# Patient Record
(demographics unavailable — no encounter records)

---

## 2024-10-25 NOTE — PHYSICAL EXAM
[3rd] : 3rd [PIP Joint] : PIP joint [5___] : volarflexion 5[unfilled]/5 [] : good capillary refill in all fingers [Left] : left fingers [There are no fractures, subluxations or dislocations. No significant abnormalities are seen] : There are no fractures, subluxations or dislocations. No significant abnormalities are seen [Open growth plates] : Open growth plates

## 2024-10-25 NOTE — HISTORY OF PRESENT ILLNESS
[de-identified] : Patient presents for LT middle finger pain evaluation. Patient states that she was playing basketball 10/20/2024 and jammed her finger on the ball. Patient states that she is LHD. Patient states that she has some swelling. Patient states that she has been icing and wears a splint occasionally.

## 2024-10-25 NOTE — DISCUSSION/SUMMARY
[de-identified] : I reviewed patient's radiographs and discussed her condition and treatment options.  Defer further imaging or immobilization.  Resume activities as tolerated.  Follow up as needed.  Patient and her mother voiced understanding and agreement with the plan.